# Patient Record
(demographics unavailable — no encounter records)

---

## 2024-11-22 NOTE — HISTORY OF PRESENT ILLNESS
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 He has taken NSAIDS, stretching, HEP and he remains symptomatic. No radicular complaints.  [] : no [FreeTextEntry1] : l shoulder [FreeTextEntry5] : shoulder pain for years, no recent injury  [FreeTextEntry6] : numbness [de-identified] : lifting , any activity with pulling movement, fast arm movements [de-identified] :

## 2024-11-22 NOTE — PHYSICAL EXAM
[Sitting] : sitting [Mild] : mild [5___] : external rotation 5[unfilled]/5 [Left] : left shoulder [There are no fractures, subluxations or dislocations. No significant abnormalities are seen] : There are no fractures, subluxations or dislocations. No significant abnormalities are seen [] : no scapular winging [TWNoteComboBox7] : active forward flexion 165 degrees [TWNoteComboBox6] : internal rotation L4 [de-identified] : external rotation 60 degrees

## 2024-11-22 NOTE — DISCUSSION/SUMMARY
[de-identified] : Case discussed MRI left shoulder eval for labral tear.  Return to the office when completed.   RE:  JUNIE QUINONEZ   Acct #- [id number]   Attention:  Nurse Reviewer /Medical Director    Based on my patient's condition, I strongly believe that the MRI  L shoulder is medically.necessary.   The patient has failed oral meds, injections and PT and conservative treatment in combination or by themselves and therefore needs the MRI.   The MRI will dictate further treatment t recommendations.

## 2024-12-06 NOTE — PHYSICAL EXAM
[Left] : left shoulder [Sitting] : sitting [Mild] : mild [5___] : external rotation 5[unfilled]/5 [] : no swelling [TWNoteComboBox7] : active forward flexion 165 degrees [TWNoteComboBox6] : internal rotation L4 [de-identified] : external rotation 60 degrees

## 2024-12-06 NOTE — HISTORY OF PRESENT ILLNESS
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 He has taken NSAIDS, stretching, HEP and he remains symptomatic.Underwent recent MRI.  [] : no [FreeTextEntry1] : l shoulder [de-identified] : pulling motions, push ups, boxing  [de-identified] : MRI [facility] [de-identified] : [facility]

## 2024-12-06 NOTE — DISCUSSION/SUMMARY
[de-identified] : Case discussed Patient allowed to gently start resuming activities. Discussed change to medication prescription and usage. Bracing options discussed with patient for stability and support. Activity modification as needed Discussed poss future surgery, pt deciding, questions answered, no guarantees try topical lidocaine for pain control reviewed current medications used by this patient Home exercises for functional return lettter of med necessity for L shoulder labral repair arthrosocpy

## 2024-12-06 NOTE — DATA REVIEWED
[MRI] : MRI [Left] : left [Shoulder] : shoulder [Report was reviewed and noted in the chart] : The report was reviewed and noted in the chart [I reviewed the films/CD and agree] : I reviewed the films/CD and agree [FreeTextEntry1] : anterior non displaced anterior labral tear, posteroinferior labral tear, SS and infraspinatus tendinosis.